# Patient Record
Sex: MALE | Race: WHITE | URBAN - METROPOLITAN AREA
[De-identification: names, ages, dates, MRNs, and addresses within clinical notes are randomized per-mention and may not be internally consistent; named-entity substitution may affect disease eponyms.]

---

## 2024-05-24 ENCOUNTER — OFFICE VISIT (OUTPATIENT)
Dept: ENT CLINIC | Age: 34
End: 2024-05-24
Payer: COMMERCIAL

## 2024-05-24 VITALS
TEMPERATURE: 98.1 F | DIASTOLIC BLOOD PRESSURE: 85 MMHG | HEART RATE: 75 BPM | OXYGEN SATURATION: 97 % | BODY MASS INDEX: 41.58 KG/M2 | WEIGHT: 297 LBS | HEIGHT: 71 IN | SYSTOLIC BLOOD PRESSURE: 130 MMHG

## 2024-05-24 DIAGNOSIS — H61.22 IMPACTED CERUMEN OF LEFT EAR: ICD-10-CM

## 2024-05-24 DIAGNOSIS — Z86.69 HISTORY OF RECURRENT EAR INFECTION: Primary | Chronic | ICD-10-CM

## 2024-05-24 DIAGNOSIS — J30.1 SEASONAL ALLERGIC RHINITIS DUE TO POLLEN: Chronic | ICD-10-CM

## 2024-05-24 PROCEDURE — 69210 REMOVE IMPACTED EAR WAX UNI: CPT | Performed by: OTOLARYNGOLOGY

## 2024-05-24 PROCEDURE — 99203 OFFICE O/P NEW LOW 30 MIN: CPT | Performed by: OTOLARYNGOLOGY

## 2024-05-24 NOTE — PATIENT INSTRUCTIONS
Schedule an audiogram (hearing test), if your hearing is not back to your usual ability, or if hearing loss or tinnitus (ringing or other noise) persists, despite removal of ear wax,.  Schedule an appointment for ear recheck and possible cleaning in the future if your hearing is decreased, or you have a sensation of ear wax build up or are told you have ear wax build up by your primary physician or other health care provider.    You may use an over the counter ear wax removal kit (such as Murine, Bausch and Lomb, NeilMed, or Debrox wax removal system) for ear wax removal, as needed.  It may help to use Debrox (OTC) for 4 days prior to future visits for ear cleaning.  This may soften your ear wax and facilitate removal of the wax.        NO Q-TIPS OR OTHER INSTRUMENTS/OBJECTS IN THE EARS   You should never clean your ears with a Q-tip, cotton tipped applicator, Heidy pin, paper clip, safety pin, pen cap, or any other instrument.  This will tend to push wax in deeper and pack the ear canal with wax.  There is a high risk and danger of this practice, especially rupture of ear drum, dislocation or other damage to ossicles, and permanent, irreversible, and irreparable hearing loss.  It may cause inflammation and irritation of the ear canal and cause itching or pain.  I recommend only use of one the several ear wax removal kits available \"over the counter\" if you feel a need to try to remove ear wax.  For example, Murine, Bausch and Lomb, NeilMed, or Debrox ear wax removal kits may be used for ear wax removal, as needed.  No other methods should be self used for cleaning your ears.           SWIMMER'S EAR PREVENTATIVE DROPS    Mix one part white household vinegar to 2 parts isopropyl (rubbing) alcohol into a dropper bottle.  After you have gotten water in the ear, drain the water from the ear using a tissue wrapped finger or cotton ball.  Apply 6 to 8 drops of the swimmer's ear solution into the ear.  Wait about 2

## 2024-05-24 NOTE — PROGRESS NOTES
vinegar to 2 parts isopropyl (rubbing) alcohol into a dropper bottle.  After you have gotten water in the ear, drain the water from the ear using a tissue wrapped finger or cotton ball.  Apply 6 to 8 drops of the swimmer's ear solution into the ear.  Wait about 2 minutes and then drain that solution from the ear with a tissue wrapped finger or cotton ball.      The alcohol will absorb and remove the water from the ear, evaporate, and leave the acetic acid from the vinegar behind.  This will acidify the skin of the external canal and help to prevent infection.      Thaddeus Salazar MD    [facility]  Department of Otolaryngology/Head and Neck Surgery  [facility]  [address], [address]  [place] [address]  [phone]